# Patient Record
Sex: FEMALE | Race: AMERICAN INDIAN OR ALASKA NATIVE
[De-identification: names, ages, dates, MRNs, and addresses within clinical notes are randomized per-mention and may not be internally consistent; named-entity substitution may affect disease eponyms.]

---

## 2018-04-02 ENCOUNTER — HOSPITAL ENCOUNTER (EMERGENCY)
Dept: HOSPITAL 5 - ED | Age: 23
Discharge: HOME | End: 2018-04-02
Payer: SELF-PAY

## 2018-04-02 VITALS — SYSTOLIC BLOOD PRESSURE: 139 MMHG | DIASTOLIC BLOOD PRESSURE: 94 MMHG

## 2018-04-02 DIAGNOSIS — J45.909: ICD-10-CM

## 2018-04-02 DIAGNOSIS — R22.43: Primary | ICD-10-CM

## 2018-04-02 DIAGNOSIS — I10: ICD-10-CM

## 2018-04-02 DIAGNOSIS — F17.200: ICD-10-CM

## 2018-04-02 PROCEDURE — 99282 EMERGENCY DEPT VISIT SF MDM: CPT

## 2018-04-02 NOTE — EMERGENCY DEPARTMENT REPORT
ED Extremity Problem HPI





- General


Chief complaint: Extremity Injury, Lower


Stated complaint: BOTH FEET SWOLLEN


Time Seen by Provider: 04/02/18 16:22


Source: patient


Mode of arrival: Ambulatory


Limitations: No Limitations





- History of Present Illness


Initial comments: 





Patient is a 23-year-old black female who is currently with lower extremity 

pain and swelling.  Patient states that her bilateral feet and ankles she's had 

some swelling for the last several weeks.  Patient works at a plant and is on 

her feet daily.  Patient states the swelling is slightly better in the mornings 

and collects throughout the day.  Patient denies any chest pain shortness of 

breath nausea vomiting fevers chills decreased urination





- Related Data


 Previous Rx's











 Medication  Instructions  Recorded  Last Taken  Type


 


Furosemide [Lasix] 20 mg PO DAILY #7 tablet 04/02/18 Unknown Rx


 


traMADol [Ultram] 50 mg PO Q6HR PRN #14 tablet 04/02/18 Unknown Rx











 Allergies











Allergy/AdvReac Type Severity Reaction Status Date / Time


 


No Known Allergies Allergy   Unverified 04/02/18 13:59














ED Review of Systems


ROS: 


Stated complaint: BOTH FEET SWOLLEN


Other details as noted in HPI





Comment: All other systems reviewed and negative





ED Past Medical Hx





- Past Medical History


Hx Hypertension: Yes


Hx Asthma: Yes





- Surgical History


Past Surgical History?: No





- Social History


Smoking Status: Current Every Day Smoker


Substance Use Type: Alcohol





- Medications


Home Medications: 


 Home Medications











 Medication  Instructions  Recorded  Confirmed  Last Taken  Type


 


Furosemide [Lasix] 20 mg PO DAILY #7 tablet 04/02/18  Unknown Rx


 


traMADol [Ultram] 50 mg PO Q6HR PRN #14 tablet 04/02/18  Unknown Rx














ED Physical Exam





- General


Limitations: No Limitations


General appearance: alert, in no apparent distress





- Head


Head exam: Present: atraumatic, normocephalic





- Eye


Eye exam: Present: normal appearance





- ENT


ENT exam: Present: mucous membranes moist





- Neck


Neck exam: Present: normal inspection





- Respiratory


Respiratory exam: Present: normal lung sounds bilaterally.  Absent: respiratory 

distress





- Cardiovascular


Cardiovascular Exam: Present: regular rate, normal rhythm.  Absent: systolic 

murmur, diastolic murmur, rubs, gallop





- GI/Abdominal


GI/Abdominal exam: Present: soft, normal bowel sounds





- Extremities Exam


Extremities exam: Present: normal inspection, pedal edema





- Back Exam


Back exam: Present: normal inspection





- Neurological Exam


Neurological exam: Present: alert, oriented X3





- Psychiatric


Psychiatric exam: Present: normal affect, normal mood





- Skin


Skin exam: Present: warm, dry, intact, normal color.  Absent: rash





ED Course





 Vital Signs











  04/02/18





  13:56


 


Temperature 98.3 F


 


Pulse Rate 84


 


Respiratory 18





Rate 


 


Blood Pressure 139/94


 


O2 Sat by Pulse 100





Oximetry 














ED Medical Decision Making





- Medical Decision Making





Patient is exhibiting some dependent edema who suggested that the patient use 

compression stockings will give a short course of Lasix and something for pain 

and patient will be discharged home.


Critical care attestation.: 


If time is entered above; I have spent that time in minutes in the direct care 

of this critically ill patient, excluding procedure time.








ED Disposition


Clinical Impression: 


 Dependent edema





Disposition: DC-01 TO HOME OR SELFCARE


Is pt being admited?: No


Does the pt Need Aspirin: No


Condition: Stable


Instructions:  Leg Edema (ED)


Prescriptions: 


Furosemide [Lasix] 20 mg PO DAILY #7 tablet


traMADol [Ultram] 50 mg PO Q6HR PRN #14 tablet


 PRN Reason: Pain


Referrals: 


JACKY TOMAS MD [Staff Physician] - as needed